# Patient Record
Sex: FEMALE | Race: WHITE | NOT HISPANIC OR LATINO | Employment: OTHER | URBAN - METROPOLITAN AREA
[De-identification: names, ages, dates, MRNs, and addresses within clinical notes are randomized per-mention and may not be internally consistent; named-entity substitution may affect disease eponyms.]

---

## 2017-08-02 ENCOUNTER — OFFICE VISIT (OUTPATIENT)
Dept: ORTHOPEDIC SURGERY | Facility: CLINIC | Age: 62
End: 2017-08-02

## 2017-08-02 VITALS — TEMPERATURE: 98.3 F | WEIGHT: 110 LBS | HEIGHT: 64 IN | BODY MASS INDEX: 18.78 KG/M2

## 2017-08-02 DIAGNOSIS — M25.552 BILATERAL HIP PAIN: Primary | ICD-10-CM

## 2017-08-02 DIAGNOSIS — M25.551 BILATERAL HIP PAIN: Primary | ICD-10-CM

## 2017-08-02 PROCEDURE — 73521 X-RAY EXAM HIPS BI 2 VIEWS: CPT | Performed by: ORTHOPAEDIC SURGERY

## 2017-08-02 PROCEDURE — 99213 OFFICE O/P EST LOW 20 MIN: CPT | Performed by: ORTHOPAEDIC SURGERY

## 2017-08-02 RX ORDER — HYDROCHLOROTHIAZIDE 25 MG/1
25 TABLET ORAL DAILY
COMMUNITY

## 2017-08-02 NOTE — PROGRESS NOTES
Chief Complaint   Patient presents with   • Left Hip - Establish Care   • Right Hip - Establish Care             HPI new patient appt on bilateral hips With pain and discomfort on walking.  Duration of complaints is about 2 years.  Symptoms are worse for the past 6 months.  She has difficulty in turning over in bed at night.  She has increasing pain and discomfort when she's been riding in a vehicle for a period of time.  She has a sense of stiffness associated with the aching sensation.  The pain is intermittent.  It is worse on her left hip compared to the right side.  She does not have any risk factors for avascular necrosis.  She has some benefit to her pain with the use of anti-inflammatory medication.  She cannot walk briskly or turn over in bed at night because of the hip pain and discomfort.  The pain is mostly over the greater trochanteric bursa and radiates over the lateral aspect of the femur.  There are some aspects of her pain which do radiate into the groin and are worse with active abduction or internal rotation associated with abduction.  She has a lot of pain and discomfort when she is physically intimate with her spouse.  She also has a lot of difficulty with abducting her legs and proceeding with the toileting and cleansing activities.  There is a limp on the left side associated with the pain and discomfort as well.          No Known Allergies      Social History     Social History   • Marital status:      Spouse name: N/A   • Number of children: N/A   • Years of education: N/A     Occupational History   • Not on file.     Social History Main Topics   • Smoking status: Never Smoker   • Smokeless tobacco: Never Used   • Alcohol use No   • Drug use: No   • Sexual activity: Not on file     Other Topics Concern   • Not on file     Social History Narrative   • No narrative on file       Family History   Problem Relation Age of Onset   • Cancer Other    • Hypertension Other        Past Surgical  History:   Procedure Laterality Date   • APPENDECTOMY     •  SECTION         History reviewed. No pertinent past medical history.        Vitals:    17 1551   Temp: 98.3 °F (36.8 °C)             Review of Systems   Constitutional: Positive for unexpected weight change.   HENT: Negative.    Eyes: Negative.    Respiratory: Negative.    Cardiovascular: Negative.    Gastrointestinal: Negative.    Endocrine: Negative.    Genitourinary: Negative.    Musculoskeletal: Negative.    Skin: Negative.    Allergic/Immunologic: Negative.    Neurological: Negative.    Hematological: Negative.    Psychiatric/Behavioral: Negative.            Physical Exam   Constitutional: Vital signs are normal. She appears well-developed and well-nourished. She appears cachectic. She is sleeping and active.   HENT:   Head: Normocephalic.   Eyes: Conjunctivae, EOM and lids are normal. Pupils are equal, round, and reactive to light. Lids are everted and swept, no foreign bodies found.   Neck: Normal range of motion and full passive range of motion without pain. Neck supple.   Cardiovascular: Normal rate, regular rhythm, S1 normal, S2 normal, normal heart sounds, intact distal pulses and normal pulses.    Pulmonary/Chest: Effort normal and breath sounds normal.   Abdominal: Soft. Normal appearance and bowel sounds are normal.   Musculoskeletal: Normal range of motion.        Right shoulder: Normal.   Neurological: She is alert. She has normal strength and normal reflexes. She displays a negative Romberg sign.   Skin: Skin is warm.   Psychiatric: She has a normal mood and affect. Her speech is normal and behavior is normal. Judgment and thought content normal. Cognition and memory are normal.   Nursing note and vitals reviewed.              Joint/Body Part Specific Exam:  bilateral hip. Neurovascular status is intact. Patient has a distant limp on the affected side. Internal and external rotations are associated with pain and discomfort.  Anterior joint line pain and tenderness is significant. Stinchfield sign is positive. Figure of 4 sign is positive. Patient is unable to perform an active straight leg raise exam. Greater trochanter is tender. Crossover adduction test is positive. Cross body adduction is limited and painful for the patient. Patient has very significant limp and joint line tenderness anteriorly, posteriorly and medially. Dorsalis pedis and posterior tibial artery pulses are palpable. Common peroneal nerve function is well preserved.          X-RAY Report:    Pelvis X-Ray  Indication: Pain in both hips.  AP and Frogleg views  Findings: Moderately advanced degenerative osteoarthritis with narrowing of the joint space both hips  no bony lesion  Soft tissues within normal limits  decreased joint spaces  Hardware appropriately positioned not applicable      no prior studies available for comparison.    X-RAY was ordered and reviewed by Raad Kumar MD            Diagnostics:            Justyna was seen today for establish care and establish care.    Diagnoses and all orders for this visit:    Bilateral hip pain  -     XR Hips Bilateral With or Without Pelvis 2 View            Procedures          I provided this patient with educational materials regarding bone health.        Plan:   Stretching and strengthening exercises.    Tablet ibuprofen 600 mg by mouth daily at bedtime when necessary pain and discomfort.    Calcium and vitamin D for bone health.    This patient is not a candidate for surgical intervention at this point and we will follow her carefully to see how she does with evolution of her symptoms.    Follow-up in my office in 6 months for reevaluation.    Eventually she might need a knee arthroplasty but that would be a last resort at this point.  I have discussed that with the patient in great detail.        CC To ROGELIO Raphael

## 2017-08-10 PROBLEM — M25.552 BILATERAL HIP PAIN: Status: ACTIVE | Noted: 2017-08-10

## 2017-08-10 PROBLEM — M25.551 BILATERAL HIP PAIN: Status: ACTIVE | Noted: 2017-08-10

## 2022-05-24 ENCOUNTER — OFFICE VISIT (OUTPATIENT)
Dept: CARDIOLOGY | Facility: CLINIC | Age: 67
End: 2022-05-24

## 2022-05-24 VITALS
HEART RATE: 68 BPM | DIASTOLIC BLOOD PRESSURE: 89 MMHG | HEIGHT: 64 IN | SYSTOLIC BLOOD PRESSURE: 121 MMHG | WEIGHT: 113 LBS | BODY MASS INDEX: 19.29 KG/M2

## 2022-05-24 DIAGNOSIS — R07.2 PRECORDIAL PAIN: Primary | ICD-10-CM

## 2022-05-24 DIAGNOSIS — I10 HYPERTENSION, ESSENTIAL: ICD-10-CM

## 2022-05-24 PROCEDURE — 99203 OFFICE O/P NEW LOW 30 MIN: CPT | Performed by: INTERNAL MEDICINE

## 2022-05-24 RX ORDER — ZINC GLUCONATE 50 MG
50 TABLET ORAL
COMMUNITY

## 2022-05-24 RX ORDER — DIPHENOXYLATE HYDROCHLORIDE AND ATROPINE SULFATE 2.5; .025 MG/1; MG/1
TABLET ORAL DAILY
COMMUNITY

## 2022-05-24 NOTE — PROGRESS NOTES
Chief Complaint  Chest Pain (Follow up)    Subjective            Justyna Lai presents to [facility] CARDIOLOGY  History of Present Illness    66-year-old white female.  She has no previous cardiac problems.  She has mild hypertension.  She has seen her primary care for back pain over the last few weeks.  Sometimes when she turns on her left side she has left chest pain which is mild.  It is only occurring at night.  It seems positional.  It is not exertional.  She stays very active during the day and it is not reproduced during the day.  She denies excessive shortness of breath or palpitations.    PMH  Past Medical History:   Diagnosis Date   • Hyperlipidemia    • Hypertension          SURGICALHX  Past Surgical History:   Procedure Laterality Date   • APPENDECTOMY     •  SECTION          SOC  Social History     Socioeconomic History   • Marital status:    Tobacco Use   • Smoking status: Never Smoker   • Smokeless tobacco: Never Used   Vaping Use   • Vaping Use: Never used   Substance and Sexual Activity   • Alcohol use: No   • Drug use: No   • Sexual activity: Defer         FAMHX  Family History   Problem Relation Age of Onset   • No Known Problems Mother    • Stroke Father    • Cancer Other    • Hypertension Other           ALLERGY  No Known Allergies     MEDSCURRENT    Current Outpatient Medications:   •  Black Elderberry 50 MG/5ML syrup, Take 1 tablet by mouth., Disp: , Rfl:   •  Cyanocobalamin (VITAMIN B 12 PO), Take  by mouth., Disp: , Rfl:   •  hydrochlorothiazide (HYDRODIURIL) 25 MG tablet, Take 25 mg by mouth Daily., Disp: , Rfl:   •  multivitamin (MULTI-VITAMIN DAILY PO), Take  by mouth Daily., Disp: , Rfl:   •  zinc gluconate 50 MG tablet, Take 50 mg by mouth., Disp: , Rfl:       Review of Systems   Constitutional: Negative.   HENT: Negative.    Eyes: Negative.    Cardiovascular: Positive for chest pain.   Respiratory: Negative.    Endocrine: Negative.   "  Hematologic/Lymphatic: Negative.    Skin: Negative.    Musculoskeletal: Positive for back pain.   Gastrointestinal: Negative.    Genitourinary: Negative.    Neurological: Negative.    Psychiatric/Behavioral: Negative.         Objective     /89   Pulse 68   Ht 162.6 cm (64\")   Wt 51.3 kg (113 lb)   BMI 19.40 kg/m²       General Appearance:   · well developed  · well nourished  HENT:   · oropharynx moist  · lips not cyanotic  Neck:  · thyroid not enlarged  · supple  Respiratory:  · no respiratory distress  · normal breath sounds  · no rales  Cardiovascular:  · no jugular venous distention  · regular rhythm  · apical impulse normal  · S1 normal, S2 normal  · no S3, no S4   · no murmur  · no rub, no thrill  · carotid pulses normal; no bruit  · pedal pulses normal  · lower extremity edema: none    Musculoskeletal:  · no clubbing of fingers.   · normocephalic, head atraumatic  Skin:   · warm, dry  Psychiatric:  · judgement and insight appropriate  · normal mood and affect      Result Review :             Data reviewed: Primary care records reviewed, laboratory studies reviewed.  EKG reviewed by me from May 2022 showing sinus rhythm with nonspecific ST changes     Procedures             Assessment and Plan        ASSESSMENT:  Encounter Diagnoses   Name Primary?   • Precordial pain Yes   • Hypertension, essential          PLAN:    1.  Ms. Lai has atypical chest pain, occurring exclusively at night, and seems to be positional.  Fortunately she is very active and this is not reproducible with physical exertion.  Her baseline EKG shows nonspecific changes.  Her only significant risk factor is hypertension.  At this time this seems very unlikely to be cardiac in origin.  I discussed with her today I would recommend a watchful waiting approach.  If symptoms worsen or become exertional or happening during the day or with activity we can consider further work-up at that time.  Otherwise would continue to monitor.  2. "  Hypertension, controlled, continue thiazide diuretic    The patient will be followed as needed otherwise          Patient was given instructions and counseling regarding her condition or for health maintenance advice. Please see specific information pulled into the AVS if appropriate.             SHAGUFTA Patterson MD  5/24/2022    10:07 EDT